# Patient Record
Sex: MALE
[De-identification: names, ages, dates, MRNs, and addresses within clinical notes are randomized per-mention and may not be internally consistent; named-entity substitution may affect disease eponyms.]

---

## 2024-08-09 PROBLEM — Z00.00 ENCOUNTER FOR PREVENTIVE HEALTH EXAMINATION: Status: ACTIVE | Noted: 2024-08-09

## 2024-08-15 ENCOUNTER — NON-APPOINTMENT (OUTPATIENT)
Age: 69
End: 2024-08-15

## 2024-09-24 ENCOUNTER — OUTPATIENT (OUTPATIENT)
Dept: OUTPATIENT SERVICES | Facility: HOSPITAL | Age: 69
LOS: 1 days | End: 2024-09-24
Payer: MEDICARE

## 2024-09-24 ENCOUNTER — NON-APPOINTMENT (OUTPATIENT)
Age: 69
End: 2024-09-24

## 2024-09-24 ENCOUNTER — APPOINTMENT (OUTPATIENT)
Dept: ORTHOPEDIC SURGERY | Facility: CLINIC | Age: 69
End: 2024-09-24
Payer: MEDICARE

## 2024-09-24 ENCOUNTER — APPOINTMENT (OUTPATIENT)
Dept: MRI IMAGING | Facility: CLINIC | Age: 69
End: 2024-09-24

## 2024-09-24 VITALS
HEIGHT: 71 IN | SYSTOLIC BLOOD PRESSURE: 157 MMHG | WEIGHT: 164 LBS | HEART RATE: 71 BPM | BODY MASS INDEX: 22.96 KG/M2 | DIASTOLIC BLOOD PRESSURE: 87 MMHG

## 2024-09-24 DIAGNOSIS — M25.562 PAIN IN LEFT KNEE: ICD-10-CM

## 2024-09-24 DIAGNOSIS — M25.569 PAIN IN UNSPECIFIED KNEE: ICD-10-CM

## 2024-09-24 DIAGNOSIS — M25.559 PAIN IN UNSPECIFIED HIP: ICD-10-CM

## 2024-09-24 DIAGNOSIS — M70.62 TROCHANTERIC BURSITIS, LEFT HIP: ICD-10-CM

## 2024-09-24 PROCEDURE — 99203 OFFICE O/P NEW LOW 30 MIN: CPT

## 2024-09-24 PROCEDURE — 73721 MRI JNT OF LWR EXTRE W/O DYE: CPT | Mod: 26,LT,MH

## 2024-09-24 PROCEDURE — 73502 X-RAY EXAM HIP UNI 2-3 VIEWS: CPT | Mod: LT

## 2024-09-24 PROCEDURE — 73562 X-RAY EXAM OF KNEE 3: CPT | Mod: LT

## 2024-09-24 PROCEDURE — 73721 MRI JNT OF LWR EXTRE W/O DYE: CPT

## 2024-09-24 NOTE — DISCUSSION/SUMMARY
[Medication Risks Reviewed] : Medication risks reviewed [Surgical risks reviewed] : Surgical risks reviewed [Other: ____] : in [unfilled] [de-identified] : A prescription for an MRI of the left knee was provided on today's office visit secondary on exam of mild anterior draw to evaluate further that may be causing instability. Patient was instructed to start physical therapy after the MRI is done.  I discussed the underlying pathophysiology of the patient's condition. I expressed to the patient that based on his physical exam and xrays,the patient reported that he has seen progression to his symptoms with physical therapy. I advised that the patient should continue to attend physical therapy if he has found some alleviation to his symptoms. Activity modifications were reviewed with the patient at length. If the patient begins to experience any changes or severe exacerbation of his symptoms, he should reach out to me as soon as possible. [Otherwise, he should return in 2 Months.]    I, Dr. Kvng Bermudez, personally performed the evaluation and management (E/M) services for this new patient.  That E/M includes conducting the initial examination, assessing all conditions, and establishing a plan of care.  Today, my ACP, Cristy Linton, was here to observe my evaluation and management services for this patient to be followed going forward.

## 2024-09-24 NOTE — REASON FOR VISIT
[Initial Visit] : an initial visit for [Hip Pain] : hip pain [Knee Pain] : knee pain [FreeTextEntry2] : Patient presents for evaluation of left hip and knee pain.

## 2024-09-24 NOTE — HISTORY OF PRESENT ILLNESS
[Pain Location] : pain [] : left knee [Worsening] : worsening [___ mths] : [unfilled] month(s) ago [3] : a current pain level of 3/10 [Intermit.] : ~He/She~ states the symptoms seem to be intermittent [Walking] : worsened by walking [Knee Flexion] : worsened with knee flexion [NSAIDs] : relieved by nonsteroidal anti-inflammatory drugs [Rest] : relieved by rest [de-identified] : Initial consultation for left knee and left hip pain patient has had many injuries in the remote past and to the left knee and in the past 3 to 4 months the pain has been increasing to the left knee. Patient is an active 68-year-old male with radiating pain of the left lateral hip to the left knee takes Aleve as needed.

## 2024-09-24 NOTE — PHYSICAL EXAM
[LE] : Sensory: Intact in bilateral lower extremities [Knee Motion Right] : full ROM [Knee Swelling Left] : swelling [Knee Motion Left Crepitus] : crepitus with ROM [Knee Motion Left] : full ROM [Knee Anterior Drawer Sign Left] : positive anterior drawer sign [Normal RLE] : Right Lower Extremity: No scars, rashes, lesions, ulcers, skin intact [Normal LLE] : Left Lower Extremity: No scars, rashes, lesions, ulcers, skin intact [Normal Touch] : sensation intact for touch [Normal] : no peripheral adenopathy appreciated [Sacroiliac Ranjan-Fabere Test Left Side] : negative Lionel [Left Hip Was Examined] : negative impingement test [Knee Swelling Right] : no swelling [Knee Tenderness On Palpation Right] : no tenderness [Knee Motion Right Crepitus] : no crepitus with ROM [Knee Anterior Drawer Sign Right] : negative anterior drawer sign [Knee Posterior Drawer Sign Right] : negative posterior drawer sign [Knee Meniscal Integ Komal's Displace Test Right Medial] : negative Komal's medially [Knee Meniscal Integ Komal's Displace Test Right Lateral] : negative Komal's laterally [Knee Tender On Palp With Quadriceps Contracted (Shrug Sign)] : negative patellar grind [Knee Tenderness On Palpation Left] : no tenderness [Knee Posterior Drawer Sign Left] : negative posterior drawer sign [Knee Meniscal Integ Komal's Displace Test Left Medial] : negative Komal's medially [FreeTextEntry2] : Small effusion is noted of the left knee when compared to the right Upon full flexion of the knee initiated pain. Mild anterior draw of the left knee in comparison to the right knee [de-identified] : AP pelvis and lateral left hip demonstrating bilateral hip joints with well-preserved joint spaces.  Lateral view of the left hip with no obvious fracture nor bony cysts.  3 views of the left knee demonstrating on AP and mild varus malalignment, lateral view with noted loose body noted in the back compartment, sunrise view demonstrating lateralization of degenerative space a large osteophyte on the lateral patella. No obvious fracture.

## 2024-09-27 ENCOUNTER — TRANSCRIPTION ENCOUNTER (OUTPATIENT)
Age: 69
End: 2024-09-27

## 2024-10-22 ENCOUNTER — APPOINTMENT (OUTPATIENT)
Dept: ORTHOPEDIC SURGERY | Facility: CLINIC | Age: 69
End: 2024-10-22
Payer: MEDICARE

## 2024-10-22 DIAGNOSIS — S83.242A OTHER TEAR OF MEDIAL MENISCUS, CURRENT INJURY, LEFT KNEE, INITIAL ENCOUNTER: ICD-10-CM

## 2024-10-22 PROCEDURE — 20610 DRAIN/INJ JOINT/BURSA W/O US: CPT | Mod: LT

## 2024-10-22 PROCEDURE — 99214 OFFICE O/P EST MOD 30 MIN: CPT | Mod: 25

## 2024-10-22 RX ORDER — LIDOCAINE HYDROCHLORIDE 10 MG/ML
1 INJECTION, SOLUTION INFILTRATION; PERINEURAL
Refills: 0 | Status: COMPLETED | OUTPATIENT
Start: 2024-10-22

## 2024-10-22 RX ORDER — METHYLPRED ACET/NACL,ISO-OS/PF 40 MG/ML
40 VIAL (ML) INJECTION
Refills: 0 | Status: COMPLETED | OUTPATIENT
Start: 2024-10-22

## 2024-10-22 RX ADMIN — LIDOCAINE HYDROCHLORIDE 3 %: 10 INJECTION, SOLUTION INFILTRATION; PERINEURAL at 00:00

## 2024-10-22 RX ADMIN — METHYLPREDNISOLONE ACETATE 2 MG/ML: 40 INJECTION, SUSPENSION INTRA-ARTICULAR; INTRALESIONAL; INTRAMUSCULAR; SOFT TISSUE at 00:00

## 2024-11-19 ENCOUNTER — APPOINTMENT (OUTPATIENT)
Dept: ORTHOPEDIC SURGERY | Facility: CLINIC | Age: 69
End: 2024-11-19
Payer: MEDICARE

## 2024-11-19 VITALS — WEIGHT: 167 LBS | BODY MASS INDEX: 23.38 KG/M2 | HEIGHT: 71 IN

## 2024-11-19 DIAGNOSIS — S83.242A OTHER TEAR OF MEDIAL MENISCUS, CURRENT INJURY, LEFT KNEE, INITIAL ENCOUNTER: ICD-10-CM

## 2024-11-19 PROCEDURE — 99213 OFFICE O/P EST LOW 20 MIN: CPT

## 2025-04-14 ENCOUNTER — APPOINTMENT (OUTPATIENT)
Dept: ORTHOPEDIC SURGERY | Facility: CLINIC | Age: 70
End: 2025-04-14
Payer: MEDICARE

## 2025-04-14 DIAGNOSIS — M25.562 PAIN IN LEFT KNEE: ICD-10-CM

## 2025-04-14 PROCEDURE — 20610 DRAIN/INJ JOINT/BURSA W/O US: CPT | Mod: LT

## 2025-04-14 PROCEDURE — 99214 OFFICE O/P EST MOD 30 MIN: CPT | Mod: 25

## 2025-04-14 RX ORDER — HYALURONATE SOD, CROSS-LINKED 30 MG/3 ML
30 SYRINGE (ML) INTRAARTICULAR
Refills: 0 | Status: COMPLETED | OUTPATIENT
Start: 2025-04-14

## 2025-04-14 RX ORDER — LIDOCAINE HCL/PF 10 MG/ML
1 VIAL (ML) INJECTION
Refills: 0 | Status: COMPLETED | OUTPATIENT
Start: 2025-04-14

## 2025-04-14 RX ADMIN — Medication 0 MG/3ML: at 00:00

## 2025-04-14 RX ADMIN — LIDOCAINE HYDROCHLORIDE 0 %: 10 INJECTION, SOLUTION INFILTRATION; PERINEURAL at 00:00
